# Patient Record
Sex: MALE | Race: WHITE
[De-identification: names, ages, dates, MRNs, and addresses within clinical notes are randomized per-mention and may not be internally consistent; named-entity substitution may affect disease eponyms.]

---

## 2019-08-08 ENCOUNTER — HOSPITAL ENCOUNTER (OUTPATIENT)
Dept: HOSPITAL 39 - RAD | Age: 29
End: 2019-08-08
Attending: ORTHOPAEDIC SURGERY
Payer: COMMERCIAL

## 2019-08-08 DIAGNOSIS — M25.511: Primary | ICD-10-CM

## 2019-08-08 NOTE — RAD
EXAM DESCRIPTION: Shoulder,Right four x-ray Views



CLINICAL HISTORY: SHOULDER PAIN



COMPARISON: None Available.



TECHNIQUE: Four views of the right shoulder.



FINDINGS: 

There is adequate internal and external rotation. Normal

glenohumeral alignment on transaxillary and transscapular Y

views.

There is no fracture or dislocation.

There are no significant degenerative changes observed.

AC joint appears intact.

No focal bone lesion.



IMPRESSION:



Negative for fracture or dislocation.



Electronically signed by:  Robert Garcia MD  8/8/2019 10:26

AM CDT Workstation: [id number]